# Patient Record
Sex: MALE | URBAN - METROPOLITAN AREA
[De-identification: names, ages, dates, MRNs, and addresses within clinical notes are randomized per-mention and may not be internally consistent; named-entity substitution may affect disease eponyms.]

---

## 2022-04-30 ENCOUNTER — ATHLETIC TRAINING (OUTPATIENT)
Dept: SPORTS MEDICINE | Facility: OTHER | Age: 22
End: 2022-04-30

## 2022-04-30 DIAGNOSIS — S43.51XA ACROMIOCLAVICULAR SPRAIN, RIGHT, INITIAL ENCOUNTER: Primary | ICD-10-CM

## 2022-05-02 NOTE — PROGRESS NOTES
Athletic Training Shoulder Evaluation    Name: Mariam Putnam  Age: 23 y o    School District: [employer]   Sport: Wrestling  Date of Assessment: 4/30/2022    Assessment/Plan:     Visit Diagnosis: Acromioclavicular sprain, right, initial encounter [[id number] [id number]]    Treatment Plan: Initial evaluation    [x]  Follow-up PRN  []  Follow-up prior to next practice/game for re-evaluation  []  Daily treatment/rehab  Progress note expected weekly  Referral:     [x]  Not needed at this time  []  Referred to:     []  Coaching staff notified  []  Parent/Guardian Notified    Subjective:    Date of Injury: 4/30/22    Injury occurred during:     []  Practice  []  Competition  []  Other: [employer] Altamiranof[employer] over the weekend with friends    Mechanism: [employer] drill  Is not sure if they hurt themselves colliding with another person or landing on outstretched hand      Previous History: None    Reported Symptoms:     [] Felt/heard a pop [] Pressure   [x] Pain with rest [] Locking   [] Pain with activity [] Burning   [] Pain with overhead activity [x] Weakness   [] Paresthesia [] Loss of motion   [] Sharp pain [] Crepitus   [x] Dull pain [] Clicking   [] Radiating pain [] Popping sensation   [] Felt give way [] Snapping sensation   [x] [address] [] Cervical pain     Objective:    Observation:     [x]  No observable findings compared bilaterally    [] Swelling [] Asymmetry (in motion)   [] Ecchymosis [] Winged scapula   [] Deformity [] Scapular dyskinesis   [] Atrophy [] Uneven shoulders   [] Muscle spasm [] Spine curvature     Palpation: TTP over AC joint     Active Range of Motion:      Full  ROM Limited  ROM Pain  with  ROM No  Motion   Shoulder Flexion [] [x] [x] []   Shoulder Extension [] [x] [x] []   Shoulder Abduction [x] [] [] []   Shoulder Adduction [] [x] [x] []   Horizontal Abduction [] [x] [x] []   Horizontal Adduction [] [x] [x] []   Internal Rotation  [x] [] [] []   Internal Rotation  [x] [] [] []   Scapular Retraction [x] [] [] []   Scapular Protraction [x] [] [] []     Manual Muscle Tests:     Not performed [x]             5 4+ 4 4- 3 or  Under   Shoulder Flexion [] [] [] [] []   Shoulder Extension [] [] [] [] []   Shoulder Abduction [] [] [] [] []   Shoulder Adduction [] [] [] [] []   Horizontal Abduction [] [] [] [] []   Horizontal Adduction [] [] [] [] []   Internal Rotation  [] [] [] [] []   Internal Rotation  [] [] [] [] []     Special Tests:      (+)  POS (-)  NEG Not  Tested   Anterior Apprehension [] [x] []   Relocation [] [x] []   Posterior Apprehension [] [x] []   Anterior Load & Shift [] [x] []   AC Compression [x] [] []   Sulcus Sign [] [x] []   Clunk [] [x] []   Crank [] [x] []   Drop Arm [] [x] []   Empty Can [x] [] []   Charissa's [] [x] []   Speed's [] [x] []   Maxinekin's [] [x] []   Neer's [] [x] []   Vigo's [] [x] []   Sinaion's [] [x] []   Harris's [] [x] []     Treatment Log:  Pt was worried that he dislocated his shoulder or fractured his clavicle  Stated that he was going to make an appointment with his PCP soon       Date: 4/30/22   Playing Status: Limited       Exercise/Treatment    Pt was given a sling and told to check in with staff